# Patient Record
Sex: MALE | Race: WHITE | NOT HISPANIC OR LATINO | Employment: STUDENT | URBAN - METROPOLITAN AREA
[De-identification: names, ages, dates, MRNs, and addresses within clinical notes are randomized per-mention and may not be internally consistent; named-entity substitution may affect disease eponyms.]

---
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
## 2021-06-08 NOTE — TELEPHONE ENCOUNTER
Calling about a medication that had been prescribed to him after he had called in and been given a virtual urgent care visit.  Unable to see this visit.  Geraldo does not know the name of his clinic, or who he spoke with. Transferred to  nurse to see if they are able to open a chart.

## 2021-09-25 ENCOUNTER — HEALTH MAINTENANCE LETTER (OUTPATIENT)
Age: 21
End: 2021-09-25

## 2022-01-15 ENCOUNTER — HEALTH MAINTENANCE LETTER (OUTPATIENT)
Age: 22
End: 2022-01-15

## 2023-01-07 ENCOUNTER — HEALTH MAINTENANCE LETTER (OUTPATIENT)
Age: 23
End: 2023-01-07

## 2023-04-22 ENCOUNTER — HEALTH MAINTENANCE LETTER (OUTPATIENT)
Age: 23
End: 2023-04-22

## 2024-03-12 ENCOUNTER — ANESTHESIA EVENT (OUTPATIENT)
Dept: SURGERY | Facility: HOSPITAL | Age: 24
End: 2024-03-12
Payer: COMMERCIAL

## 2024-03-12 ENCOUNTER — HOSPITAL ENCOUNTER (OUTPATIENT)
Facility: HOSPITAL | Age: 24
Discharge: HOME | End: 2024-03-12
Attending: COLON & RECTAL SURGERY | Admitting: COLON & RECTAL SURGERY
Payer: COMMERCIAL

## 2024-03-12 ENCOUNTER — ANESTHESIA (OUTPATIENT)
Dept: SURGERY | Facility: HOSPITAL | Age: 24
End: 2024-03-12
Payer: COMMERCIAL

## 2024-03-12 VITALS
DIASTOLIC BLOOD PRESSURE: 62 MMHG | RESPIRATION RATE: 20 BRPM | OXYGEN SATURATION: 97 % | TEMPERATURE: 98 F | WEIGHT: 177.1 LBS | HEART RATE: 88 BPM | SYSTOLIC BLOOD PRESSURE: 116 MMHG | HEIGHT: 72 IN | BODY MASS INDEX: 23.99 KG/M2

## 2024-03-12 DIAGNOSIS — K60.30 ANAL FISTULA: Primary | ICD-10-CM

## 2024-03-12 PROCEDURE — 258N000003 HC RX IP 258 OP 636: Performed by: STUDENT IN AN ORGANIZED HEALTH CARE EDUCATION/TRAINING PROGRAM

## 2024-03-12 PROCEDURE — 360N000075 HC SURGERY LEVEL 2, PER MIN: Performed by: COLON & RECTAL SURGERY

## 2024-03-12 PROCEDURE — 250N000009 HC RX 250: Performed by: NURSE ANESTHETIST, CERTIFIED REGISTERED

## 2024-03-12 PROCEDURE — 250N000009 HC RX 250: Performed by: COLON & RECTAL SURGERY

## 2024-03-12 PROCEDURE — 250N000011 HC RX IP 250 OP 636: Performed by: NURSE ANESTHETIST, CERTIFIED REGISTERED

## 2024-03-12 PROCEDURE — 999N000141 HC STATISTIC PRE-PROCEDURE NURSING ASSESSMENT: Performed by: COLON & RECTAL SURGERY

## 2024-03-12 PROCEDURE — 710N000012 HC RECOVERY PHASE 2, PER MINUTE: Performed by: COLON & RECTAL SURGERY

## 2024-03-12 PROCEDURE — 250N000011 HC RX IP 250 OP 636: Performed by: COLON & RECTAL SURGERY

## 2024-03-12 PROCEDURE — 370N000017 HC ANESTHESIA TECHNICAL FEE, PER MIN: Performed by: COLON & RECTAL SURGERY

## 2024-03-12 PROCEDURE — C9290 INJ, BUPIVACAINE LIPOSOME: HCPCS | Performed by: COLON & RECTAL SURGERY

## 2024-03-12 PROCEDURE — 272N000001 HC OR GENERAL SUPPLY STERILE: Performed by: COLON & RECTAL SURGERY

## 2024-03-12 RX ORDER — OXYCODONE HYDROCHLORIDE 5 MG/1
5 TABLET ORAL
Status: DISCONTINUED | OUTPATIENT
Start: 2024-03-12 | End: 2024-03-12 | Stop reason: HOSPADM

## 2024-03-12 RX ORDER — HYDROMORPHONE HYDROCHLORIDE 1 MG/ML
0.4 INJECTION, SOLUTION INTRAMUSCULAR; INTRAVENOUS; SUBCUTANEOUS EVERY 5 MIN PRN
Status: DISCONTINUED | OUTPATIENT
Start: 2024-03-12 | End: 2024-03-12 | Stop reason: HOSPADM

## 2024-03-12 RX ORDER — MAGNESIUM HYDROXIDE 1200 MG/15ML
LIQUID ORAL PRN
Status: DISCONTINUED | OUTPATIENT
Start: 2024-03-12 | End: 2024-03-12 | Stop reason: HOSPADM

## 2024-03-12 RX ORDER — ONDANSETRON 2 MG/ML
INJECTION INTRAMUSCULAR; INTRAVENOUS PRN
Status: DISCONTINUED | OUTPATIENT
Start: 2024-03-12 | End: 2024-03-12

## 2024-03-12 RX ORDER — OXYCODONE HYDROCHLORIDE 5 MG/1
5 TABLET ORAL EVERY 6 HOURS PRN
Qty: 5 TABLET | Refills: 0 | Status: SHIPPED | OUTPATIENT
Start: 2024-03-12 | End: 2024-03-15

## 2024-03-12 RX ORDER — SODIUM CHLORIDE, SODIUM LACTATE, POTASSIUM CHLORIDE, CALCIUM CHLORIDE 600; 310; 30; 20 MG/100ML; MG/100ML; MG/100ML; MG/100ML
INJECTION, SOLUTION INTRAVENOUS CONTINUOUS
Status: DISCONTINUED | OUTPATIENT
Start: 2024-03-12 | End: 2024-03-12 | Stop reason: HOSPADM

## 2024-03-12 RX ORDER — SULFASALAZINE 500 MG/1
1000 TABLET ORAL 2 TIMES DAILY
COMMUNITY

## 2024-03-12 RX ORDER — ONDANSETRON 4 MG/1
4 TABLET, ORALLY DISINTEGRATING ORAL EVERY 30 MIN PRN
Status: DISCONTINUED | OUTPATIENT
Start: 2024-03-12 | End: 2024-03-12 | Stop reason: HOSPADM

## 2024-03-12 RX ORDER — FENTANYL CITRATE 50 UG/ML
50 INJECTION, SOLUTION INTRAMUSCULAR; INTRAVENOUS EVERY 5 MIN PRN
Status: DISCONTINUED | OUTPATIENT
Start: 2024-03-12 | End: 2024-03-12 | Stop reason: HOSPADM

## 2024-03-12 RX ORDER — PROPOFOL 10 MG/ML
INJECTION, EMULSION INTRAVENOUS CONTINUOUS PRN
Status: DISCONTINUED | OUTPATIENT
Start: 2024-03-12 | End: 2024-03-12

## 2024-03-12 RX ORDER — FENTANYL CITRATE 50 UG/ML
25 INJECTION, SOLUTION INTRAMUSCULAR; INTRAVENOUS EVERY 5 MIN PRN
Status: DISCONTINUED | OUTPATIENT
Start: 2024-03-12 | End: 2024-03-12 | Stop reason: HOSPADM

## 2024-03-12 RX ORDER — NALOXONE HYDROCHLORIDE 0.4 MG/ML
0.1 INJECTION, SOLUTION INTRAMUSCULAR; INTRAVENOUS; SUBCUTANEOUS
Status: DISCONTINUED | OUTPATIENT
Start: 2024-03-12 | End: 2024-03-12 | Stop reason: HOSPADM

## 2024-03-12 RX ORDER — LIDOCAINE 40 MG/G
CREAM TOPICAL
Status: DISCONTINUED | OUTPATIENT
Start: 2024-03-12 | End: 2024-03-12 | Stop reason: HOSPADM

## 2024-03-12 RX ORDER — OXYCODONE HYDROCHLORIDE 5 MG/1
10 TABLET ORAL
Status: DISCONTINUED | OUTPATIENT
Start: 2024-03-12 | End: 2024-03-12 | Stop reason: HOSPADM

## 2024-03-12 RX ORDER — ONDANSETRON 4 MG/1
4 TABLET, ORALLY DISINTEGRATING ORAL
Status: DISCONTINUED | OUTPATIENT
Start: 2024-03-12 | End: 2024-03-12 | Stop reason: HOSPADM

## 2024-03-12 RX ORDER — KETAMINE HYDROCHLORIDE 10 MG/ML
INJECTION INTRAMUSCULAR; INTRAVENOUS PRN
Status: DISCONTINUED | OUTPATIENT
Start: 2024-03-12 | End: 2024-03-12

## 2024-03-12 RX ORDER — BUPIVACAINE HYDROCHLORIDE 2.5 MG/ML
INJECTION, SOLUTION INFILTRATION; PERINEURAL PRN
Status: DISCONTINUED | OUTPATIENT
Start: 2024-03-12 | End: 2024-03-12 | Stop reason: HOSPADM

## 2024-03-12 RX ORDER — HYDROMORPHONE HYDROCHLORIDE 1 MG/ML
0.2 INJECTION, SOLUTION INTRAMUSCULAR; INTRAVENOUS; SUBCUTANEOUS EVERY 5 MIN PRN
Status: DISCONTINUED | OUTPATIENT
Start: 2024-03-12 | End: 2024-03-12 | Stop reason: HOSPADM

## 2024-03-12 RX ORDER — LIDOCAINE HYDROCHLORIDE 10 MG/ML
INJECTION, SOLUTION INFILTRATION; PERINEURAL PRN
Status: DISCONTINUED | OUTPATIENT
Start: 2024-03-12 | End: 2024-03-12

## 2024-03-12 RX ORDER — ONDANSETRON 2 MG/ML
4 INJECTION INTRAMUSCULAR; INTRAVENOUS EVERY 30 MIN PRN
Status: DISCONTINUED | OUTPATIENT
Start: 2024-03-12 | End: 2024-03-12 | Stop reason: HOSPADM

## 2024-03-12 RX ADMIN — SODIUM CHLORIDE, POTASSIUM CHLORIDE, SODIUM LACTATE AND CALCIUM CHLORIDE: 600; 310; 30; 20 INJECTION, SOLUTION INTRAVENOUS at 12:04

## 2024-03-12 RX ADMIN — PROPOFOL 200 MCG/KG/MIN: 10 INJECTION, EMULSION INTRAVENOUS at 12:28

## 2024-03-12 RX ADMIN — KETAMINE HYDROCHLORIDE 30 MG: 10 INJECTION INTRAMUSCULAR; INTRAVENOUS at 12:28

## 2024-03-12 RX ADMIN — ONDANSETRON 4 MG: 2 INJECTION INTRAMUSCULAR; INTRAVENOUS at 12:45

## 2024-03-12 RX ADMIN — LIDOCAINE HYDROCHLORIDE 5 ML: 10 INJECTION, SOLUTION INFILTRATION; PERINEURAL at 12:28

## 2024-03-12 RX ADMIN — MIDAZOLAM 2 MG: 1 INJECTION INTRAMUSCULAR; INTRAVENOUS at 12:24

## 2024-03-12 ASSESSMENT — ACTIVITIES OF DAILY LIVING (ADL)
ADLS_ACUITY_SCORE: 35

## 2024-03-12 NOTE — ANESTHESIA PREPROCEDURE EVALUATION
Anesthesia Pre-Procedure Evaluation    Patient: Geraldo Colon   MRN: [id number] : 2000        Procedure : Procedure(s):  RECTAL EXAM UNDER ANESTHESIA  POSSIBLE SETON          Past Medical History:   Diagnosis Date     Hemorrhoids      Ulcerative colitis      Ulcerative colitis (H)       Past Surgical History:   Procedure Laterality Date     ENT SURGERY      [place] [facility].     ESOPHAGOSCOPY, GASTROSCOPY, DUODENOSCOPY (EGD), COMBINED N/A 2020    Procedure: Upper esophagogastroduodenoscopy with endoscopic ultrasound;  Surgeon: Denys Mercado MD;  Location: [facility] OR      Allergies   Allergen Reactions     Amoxicillin-Pot Clavulanate Anaphylaxis     Nsaids GI Disturbance     Has Ulcerative colitis      Social History     Tobacco Use     Smoking status: Never     Smokeless tobacco: Never   Substance Use Topics     Alcohol use: Never      Wt Readings from Last 1 Encounters:   24 80.3 kg (177 lb 1.6 oz)        Anesthesia Evaluation            ROS/MED HX  ENT/Pulmonary:  - neg pulmonary ROS     Neurologic:  - neg neurologic ROS     Cardiovascular:  - neg cardiovascular ROS     METS/Exercise Tolerance:     Hematologic:       Musculoskeletal:       GI/Hepatic:  - neg GI/hepatic ROS     Renal/Genitourinary:  - neg Renal ROS     Endo:  - neg endo ROS     Psychiatric/Substance Use:       Infectious Disease:       Malignancy:       Other:          Physical Exam    Airway        Mallampati: II   TM distance: > 3 FB   Neck ROM: full     Respiratory Devices and Support         Dental       (+) Completely normal teeth      Cardiovascular   cardiovascular exam normal          Pulmonary   pulmonary exam normal            OUTSIDE LABS:  CBC:   Lab Results   Component Value Date    WBC 15.1 (H) 2020    WBC 9.3 2020    HGB 12.3 (L) 2020    HGB 12.2 (L) 2020    HCT 41.0 2020    HCT 40.6 2020     2020     2020     BMP:   Lab Results   Component Value Date  "    05/23/2020     05/18/2020    POTASSIUM 3.6 05/23/2020    POTASSIUM 3.5 05/18/2020    CHLORIDE 105 05/23/2020    CHLORIDE 106 05/18/2020    CO2 25 05/23/2020    CO2 27 05/18/2020    BUN 12 05/23/2020    BUN 15 05/18/2020    CR 0.86 05/23/2020    CR 0.95 05/18/2020     (H) 05/23/2020     (H) 05/18/2020     COAGS: No results found for: \"PTT\", \"INR\", \"FIBR\"  POC: No results found for: \"BGM\", \"HCG\", \"HCGS\"  HEPATIC:   Lab Results   Component Value Date    ALBUMIN 3.7 05/23/2020    PROTTOTAL 9.0 (H) 05/23/2020    ALT 21 05/23/2020    AST 15 05/23/2020    ALKPHOS 84 05/23/2020    BILITOTAL 0.2 05/23/2020     OTHER:   Lab Results   Component Value Date    LACT 0.6 (L) 05/23/2020    YANCI 9.6 05/23/2020    LIPASE 8,082 (H) 05/23/2020    SED 88 (H) 11/26/2006       Anesthesia Plan    ASA Status:  2       Anesthesia Type: MAC.              Consents    Anesthesia Plan(s) and associated risks, benefits, and realistic alternatives discussed. Questions answered and patient/representative(s) expressed understanding.     - Discussed: Risks, Benefits and Alternatives for BOTH SEDATION and the PROCEDURE were discussed     - Discussed with:  Patient      - Extended Intubation/Ventilatory Support Discussed: No.      - Patient is DNR/DNI Status: No     Use of blood products discussed: No .     Postoperative Care    Pain management: IV analgesics, Oral pain medications.   PONV prophylaxis: Dexamethasone or Solumedrol, Ondansetron (or other 5HT-3)     Comments:             Ambrocio Calzada,     I have reviewed the pertinent notes and labs in the chart from the past 30 days and (re)examined the patient.  Any updates or changes from those notes are reflected in this note.                  "

## 2024-03-12 NOTE — PROCEDURES
[facility] OPERATIVE NOTE    DATE OF SERVICE: 3/12/24     PROCEDURE NAME:   Rectal exam under anesthesia  Incision and drainage of perirectal abscess  Placement of seton     PRE-PROCEDURE DIAGNOSIS: ulcerative colitis, possible crohn's disease, anal fistula, perirectal abscess     POST-PROCEDURE DIAGNOSIS: same     SURGEON: Tiffanie Prakash MD     ASSISTANT: none     FINDINGS: small left anterior perirectal abscess, intersphincteric anterior anal fistula     ESTIMATED BLOOD LOSS:  2 mL     ANESTHESIA: mac with local     SPECIMEN: none    INDICATIONS FOR PROCEDURE:  Geraldo Colon is a 23 year old male with a longstanding hx of ulcerative colitis presenting with pain and drainage from his bottom concerning for an anal fistula with a small perianal abscess. We discussed an eua with drainage and likely seton placement given his history of ulcerative colitis.  The risks and benefits of surgery were discussed with the patient including bleeding, infection, damage to the sphincter complex with incontinence, need for further procedures. The patient verbalized understanding and consented to proceed.        DESCRIPTION OF PROCEDURE:  The patient was taken to the operating room and placed under mac anesthesia. They were then placed in the prone wiliam knife position on the operating room table. The buttocks were taped apart. The surgical area was then prepped and draped in the usual sterile fashion. A timeout was performed per protocol. We then started with a perianal block with 0.25% marcaine mixed with 20ml of exparel.  We examined the perianal skin externally and noted a fistula opening anterior just left of midline near the anal verge. However, on palpation further away from this/closer towards the scrotum was an area of fullness where then pus was draining from this opening nearer the anal verge. A digital rectal exam was performed which revealed no masses or stenosis.  The bivalve anoscope was inserted and we  examined the anal canal. There was no proctitis, there was mildly enlarged hemorrhoids.   In placing the fistula probe through this opening it fairly easily tracked to an internal opening midline at the dentate line, this appeared to be intersphincteric. However, given his hx of UC and now possible crohn's and with abscess we decided to leave a seton. This was secured in draining fashion with four 2-0 silk ties. Placing the probe through this same external opening we could feel it tracking away towards this area of fullness with small abscess cavity. I enlarged the external opening a bit but in order to make sure this small abscess cavity that was several cm away was adequately drained we made a small counter incision overlying the maximal area of fullness and placed a seton through the two to make sure this whole cavity drained out. This was also secured in draining fashion.     Hemostasis was achieved. We then removed the anoscope and placed some fluffs for dressing. All sponge, needle and instrument counts were correct at the end of the procedure. The patient tolerated the procedure well and was awakened from anesthesia without difficulty, and transferred to the recovery room in stable condition.    Tiffanie Prakash MD  [facility]  [phone]

## 2024-03-12 NOTE — ANESTHESIA POSTPROCEDURE EVALUATION
Patient: Geraldo Colon    Procedure: Procedure(s):  RECTAL EXAM UNDER ANESTHESIA INCSION AND DRAINAGE OF RECTAL ABSCESS  SETON PLACEMENT       Anesthesia Type:  MAC    Note:  Disposition: Outpatient   Postop Pain Control: Uneventful            Sign Out: Well controlled pain   PONV: No   Neuro/Psych: Uneventful            Sign Out: Acceptable/Baseline neuro status   Airway/Respiratory: Uneventful            Sign Out: Acceptable/Baseline resp. status   CV/Hemodynamics: Uneventful            Sign Out: Acceptable CV status; No obvious hypovolemia; No obvious fluid overload   Other NRE: NONE   DID A NON-ROUTINE EVENT OCCUR? No    Event details/Postop Comments:  Patient recovering comfortably.        Last vitals:  Vitals Value Taken Time   BP     Temp     Pulse     Resp     SpO2         Electronically Signed By: Ambrocio Calzada DO  March 12, 2024  1:22 PM

## 2024-03-12 NOTE — PROGRESS NOTES
[facility]   H&P  History of Present Illness:     23 year old male with small abscess and anal fistula    Past Medical History:   Diagnosis Date     Hemorrhoids      Ulcerative colitis      Ulcerative colitis (H)        Past Surgical History:   Procedure Laterality Date     ENT SURGERY      [facility] [facility].     ESOPHAGOSCOPY, GASTROSCOPY, DUODENOSCOPY (EGD), COMBINED N/A 7/9/2020    Procedure: Upper esophagogastroduodenoscopy with endoscopic ultrasound;  Surgeon: Denys Mercado MD;  Location:  OR       History reviewed. No pertinent family history.    Social History     Socioeconomic History     Marital status: Single     Spouse name: Not on file     Number of children: Not on file     Years of education: Not on file     Highest education level: Not on file   Occupational History     Not on file   Tobacco Use     Smoking status: Never     Smokeless tobacco: Never   Substance and Sexual Activity     Alcohol use: Never     Drug use: Never     Sexual activity: Not Currently   Other Topics Concern     Not on file   Social History Narrative     Not on file     Social Determinants of Health     Financial Resource Strain: Not on file   Food Insecurity: Not on file   Transportation Needs: Not on file   Physical Activity: Not on file   Stress: Not on file   Social Connections: Not on file   Interpersonal Safety: Not on file   Housing Stability: Not on file         Review of Systems: A full 10 point review of systems was taken and is negative aside from what is noted above in the HPI.    Physical Exam:  BP (!) 151/86 (BP Location: Left arm)   Pulse 109   Temp 98.8  F (37.1  C) (Oral)   Resp 16   Ht 1.829 m (6')   Wt 80.3 kg (177 lb 1.6 oz)   SpO2 98%   BMI 24.02 kg/m      General: NAD, alert, cooperative  Head: normocephalic, without abnormality / atraumatic  Respiratory: non-labored breathing, CTAB  CV: regular, tachycardic  Abdomen: soft, non-tender, non-distended.  No guarding or  rebound   Skin: no rashes or lesions  Musculoskeletal: moves all four extremities equally; no calf edema or tenderness  Psychological: alert and oriented, answers questions appropriately  Neurological: cranial nerves grossly intact    Assessment/Plan:   OR for eua with likely seton      Tiffanie Prakash MD  [facility]  [phone]

## 2024-03-12 NOTE — ANESTHESIA CARE TRANSFER NOTE
Patient: Geraldo Colon    Procedure: Procedure(s):  RECTAL EXAM UNDER ANESTHESIA INCSION AND DRAINAGE OF RECTAL ABSCESS  SETON PLACEMENT       Diagnosis: Anal fistula [K60.3]  Diagnosis Additional Information: No value filed.    Anesthesia Type:   MAC     Note:    Oropharynx: oropharynx clear of all foreign objects and spontaneously breathing  Level of Consciousness: awake  Oxygen Supplementation: room air    Independent Airway: airway patency satisfactory and stable  Dentition: dentition unchanged  Vital Signs Stable: post-procedure vital signs reviewed and stable  Report to RN Given: handoff report given  Patient transferred to: PACU    Handoff Report: Identifed the Patient, Identified the Reponsible Provider, Reviewed the pertinent medical history, Discussed the surgical course, Reviewed Intra-OP anesthesia mangement and issues during anesthesia, Set expectations for post-procedure period and Allowed opportunity for questions and acknowledgement of understanding      Vitals:  Vitals Value Taken Time   /82    Temp 98.6    Pulse 76    Resp 16    SpO2 99        Electronically Signed By: STEVEN Amezquita CRNA  March 12, 2024  1:22 PM

## 2024-03-12 NOTE — DISCHARGE INSTRUCTIONS
You have just undergone anorectal surgery.  Here are a few things to expect after your surgery:  1) Everybody has pain - this is expected.  I recommend staying on a baseline of tylenol and ibuprofen (advil) if you are able to take NSAIDs. Use the tylenol and ibuprofen as directed. Generally you can take the tylenol 650mg every 6 hours. You can take ibuprofen 600-800mg every 6-8 hours. Avoid ibuprofen or NSAIDs if you have Crohn's disease, Ulcerative colitis or kidney problems. We will also send you with a prescription for a stronger pain medication like oxycodone. Take this as well in addition to the others if you need it. This can make you have more constipation, and you should not drive while taking this medication. Many patients also find it helps to soak in a warm tub for at least 15 minutes at a time, three to four times a day if possible.  2) Everybody has some spotting or mild amounts of bleeding/bloody drainage.  If you see more significant bleeding, try holding some pressure over the wound for 15-20 minutes.   If you pass more than a cup full of blood or you cannot get the bleeding to stop by holding pressure, please call the office.  3) You may have a small amount of foam packing in your rectum. This will come out on its own with your first bowel movement.  4) Infections rarely occur in this area after surgery.  If you see small amounts of yellowish/pus like drainage in the days following surgery, this is expected.  It is also normal to run a low grade fever (below 101.5 F) following surgery.  5) You will feel numb in the anorectal area after surgery due to the numbing medication used in the operating room.  It is possible you may experience some fecal incontinence (accidental passage of gas/stool) during this time.  The numbness will resolve on its own.  Once you feel sensation returning, you should consider taking something for pain as you can expect oral medications to take 30-60 minutes before you  start feeling their effects.  6) There is generally no specific wound care necessary immediately after surgery.  Simply showering/bathing 1-2 times a day and after bowel movements is sufficient to keep the wounds clean.  You may want to keep a dry gauze/pad over the wound site as it is normal to have some amount of drainage, and this drainage can be irritating to the skin.  If you have a packing in the wound, your surgeon will give you more specific instructions on how to manage this.  7) For some operations you may have stitches in your wound.  Those stitches almost always break within a few days of surgery.  If you feel a pop or the wound opens - this is OK.  The wound will continue to fill in on its own.  It is still ok to shower/bathe as normal.  Expect some amount of drainage if the wound is open.  8) Do not allow yourself to go more than 2 or 3 days after surgery without having had a bowel movement.  If you wait too long, the first bowel movement may be unpleasant.  If you haven t had a bowel after 2 days, take something by mouth to help you go.  Here are a few options:  - Milk of Magnesia 30 mL every 8 hours as necessary  - Miralax 1-2 capfuls daily as necessary  - Senna 1-2 tabs twice a day as necessary  9) Your only activity restrictions are no driving while you are taking narcotics.  I would avoid heavy lifting after hemorrhoidectomy or THD for 2 weeks. You may want to avoid strenuous exercise for the first 1-2 days after surgery, but if you feel up to resuming normal activities/exercise, this is ok.  10) Lastly, if you have any questions or concerns - PLEASE CALL [phone])!  Our office has someone on call 24 hours a day.  If your question is one that can wait until normal business hours (Mon-Fri 8:30AM-5PM), it is better to wait until the daytime as someone more familiar with your care will be able to help you.  However, if it is an emergency, the on-call surgeon will be able to give you good  advice.    #You should have arranged a post operative appointment when surgery was scheduled, but if not or if you are unsure, please call the office at [phone] to arrange/schedule a post operative visit. Follow up in 1-2 weeks

## 2024-03-12 NOTE — ANESTHESIA POSTPROCEDURE EVALUATION
Patient: Geraldo Colon    Procedure: Procedure(s):  RECTAL EXAM UNDER ANESTHESIA INCSION AND DRAINAGE OF RECTAL ABSCESS  SETON PLACEMENT       Anesthesia Type:  MAC    Note:  Disposition: Outpatient   Postop Pain Control: Uneventful            Sign Out: Well controlled pain   PONV: No   Neuro/Psych: Uneventful            Sign Out: Acceptable/Baseline neuro status   Airway/Respiratory: Uneventful            Sign Out: Acceptable/Baseline resp. status   CV/Hemodynamics: Uneventful            Sign Out: Acceptable CV status; No obvious hypovolemia; No obvious fluid overload   Other NRE: NONE   DID A NON-ROUTINE EVENT OCCUR? No    Event details/Postop Comments:  Patient recovering comfortably.        Last vitals:  Vitals Value Taken Time   /62 03/12/24 1345   Temp 36.7  C (98  F) 03/12/24 1345   Pulse 84 03/12/24 1356   Resp 20 03/12/24 1345   SpO2 98 % 03/12/24 1356   Vitals shown include unfiled device data.    Electronically Signed By: Ambrocio Calzada DO  March 12, 2024  2:21 PM

## 2024-06-23 ENCOUNTER — HEALTH MAINTENANCE LETTER (OUTPATIENT)
Age: 24
End: 2024-06-23

## 2024-07-29 ENCOUNTER — HOSPITAL ENCOUNTER (OUTPATIENT)
Facility: HOSPITAL | Age: 24
Discharge: HOME | End: 2024-07-29
Attending: COLON & RECTAL SURGERY | Admitting: COLON & RECTAL SURGERY
Payer: COMMERCIAL

## 2024-07-29 ENCOUNTER — ANESTHESIA (OUTPATIENT)
Dept: SURGERY | Facility: HOSPITAL | Age: 24
End: 2024-07-29
Payer: COMMERCIAL

## 2024-07-29 ENCOUNTER — ANESTHESIA EVENT (OUTPATIENT)
Dept: SURGERY | Facility: HOSPITAL | Age: 24
End: 2024-07-29
Payer: COMMERCIAL

## 2024-07-29 VITALS
SYSTOLIC BLOOD PRESSURE: 105 MMHG | WEIGHT: 176.8 LBS | RESPIRATION RATE: 18 BRPM | BODY MASS INDEX: 23.98 KG/M2 | OXYGEN SATURATION: 97 % | DIASTOLIC BLOOD PRESSURE: 54 MMHG | HEART RATE: 63 BPM | TEMPERATURE: 97.5 F

## 2024-07-29 DIAGNOSIS — K60.30 ANAL FISTULA: Primary | ICD-10-CM

## 2024-07-29 PROCEDURE — 250N000011 HC RX IP 250 OP 636: Performed by: ANESTHESIOLOGY

## 2024-07-29 PROCEDURE — 710N000012 HC RECOVERY PHASE 2, PER MINUTE: Performed by: COLON & RECTAL SURGERY

## 2024-07-29 PROCEDURE — 46280 REMOVE ANAL FIST COMPLEX: CPT | Performed by: ANESTHESIOLOGY

## 2024-07-29 PROCEDURE — 370N000017 HC ANESTHESIA TECHNICAL FEE, PER MIN: Performed by: COLON & RECTAL SURGERY

## 2024-07-29 PROCEDURE — 258N000003 HC RX IP 258 OP 636: Performed by: REGISTERED NURSE

## 2024-07-29 PROCEDURE — 999N000141 HC STATISTIC PRE-PROCEDURE NURSING ASSESSMENT: Performed by: COLON & RECTAL SURGERY

## 2024-07-29 PROCEDURE — 272N000001 HC OR GENERAL SUPPLY STERILE: Performed by: COLON & RECTAL SURGERY

## 2024-07-29 PROCEDURE — 250N000011 HC RX IP 250 OP 636: Performed by: REGISTERED NURSE

## 2024-07-29 PROCEDURE — 250N000009 HC RX 250: Performed by: REGISTERED NURSE

## 2024-07-29 PROCEDURE — 360N000075 HC SURGERY LEVEL 2, PER MIN: Performed by: COLON & RECTAL SURGERY

## 2024-07-29 PROCEDURE — 258N000003 HC RX IP 258 OP 636: Performed by: ANESTHESIOLOGY

## 2024-07-29 PROCEDURE — 46280 REMOVE ANAL FIST COMPLEX: CPT | Performed by: REGISTERED NURSE

## 2024-07-29 PROCEDURE — 250N000013 HC RX MED GY IP 250 OP 250 PS 637: Performed by: ANESTHESIOLOGY

## 2024-07-29 PROCEDURE — 250N000009 HC RX 250: Performed by: COLON & RECTAL SURGERY

## 2024-07-29 RX ORDER — OXYCODONE HYDROCHLORIDE 5 MG/1
5 TABLET ORAL EVERY 6 HOURS PRN
Qty: 8 TABLET | Refills: 0 | Status: SHIPPED | OUTPATIENT
Start: 2024-07-29 | End: 2024-08-01

## 2024-07-29 RX ORDER — FENTANYL CITRATE 50 UG/ML
25 INJECTION, SOLUTION INTRAMUSCULAR; INTRAVENOUS
Status: DISCONTINUED | OUTPATIENT
Start: 2024-07-29 | End: 2024-07-29 | Stop reason: HOSPADM

## 2024-07-29 RX ORDER — OXYCODONE HYDROCHLORIDE 5 MG/1
10 TABLET ORAL
Status: DISCONTINUED | OUTPATIENT
Start: 2024-07-29 | End: 2024-07-29 | Stop reason: HOSPADM

## 2024-07-29 RX ORDER — GLYCOPYRROLATE 0.2 MG/ML
INJECTION, SOLUTION INTRAMUSCULAR; INTRAVENOUS PRN
Status: DISCONTINUED | OUTPATIENT
Start: 2024-07-29 | End: 2024-07-29

## 2024-07-29 RX ORDER — ONDANSETRON 4 MG/1
4 TABLET, ORALLY DISINTEGRATING ORAL
Status: DISCONTINUED | OUTPATIENT
Start: 2024-07-29 | End: 2024-07-29 | Stop reason: HOSPADM

## 2024-07-29 RX ORDER — ONDANSETRON 4 MG/1
4 TABLET, ORALLY DISINTEGRATING ORAL EVERY 30 MIN PRN
Status: DISCONTINUED | OUTPATIENT
Start: 2024-07-29 | End: 2024-07-29 | Stop reason: HOSPADM

## 2024-07-29 RX ORDER — PROPOFOL 10 MG/ML
INJECTION, EMULSION INTRAVENOUS CONTINUOUS PRN
Status: DISCONTINUED | OUTPATIENT
Start: 2024-07-29 | End: 2024-07-29

## 2024-07-29 RX ORDER — SODIUM CHLORIDE, SODIUM LACTATE, POTASSIUM CHLORIDE, CALCIUM CHLORIDE 600; 310; 30; 20 MG/100ML; MG/100ML; MG/100ML; MG/100ML
INJECTION, SOLUTION INTRAVENOUS CONTINUOUS
Status: DISCONTINUED | OUTPATIENT
Start: 2024-07-29 | End: 2024-07-29 | Stop reason: HOSPADM

## 2024-07-29 RX ORDER — ACETAMINOPHEN 160 MG
TABLET,DISINTEGRATING ORAL PRN
Status: DISCONTINUED | OUTPATIENT
Start: 2024-07-29 | End: 2024-07-29 | Stop reason: HOSPADM

## 2024-07-29 RX ORDER — NALOXONE HYDROCHLORIDE 0.4 MG/ML
0.1 INJECTION, SOLUTION INTRAMUSCULAR; INTRAVENOUS; SUBCUTANEOUS
Status: DISCONTINUED | OUTPATIENT
Start: 2024-07-29 | End: 2024-07-29 | Stop reason: HOSPADM

## 2024-07-29 RX ORDER — ACETAMINOPHEN 325 MG/1
975 TABLET ORAL ONCE
Status: COMPLETED | OUTPATIENT
Start: 2024-07-29 | End: 2024-07-29

## 2024-07-29 RX ORDER — KETAMINE HYDROCHLORIDE 10 MG/ML
INJECTION INTRAMUSCULAR; INTRAVENOUS PRN
Status: DISCONTINUED | OUTPATIENT
Start: 2024-07-29 | End: 2024-07-29

## 2024-07-29 RX ORDER — BUPIVACAINE HYDROCHLORIDE AND EPINEPHRINE 2.5; 5 MG/ML; UG/ML
INJECTION, SOLUTION EPIDURAL; INFILTRATION; INTRACAUDAL; PERINEURAL PRN
Status: DISCONTINUED | OUTPATIENT
Start: 2024-07-29 | End: 2024-07-29 | Stop reason: HOSPADM

## 2024-07-29 RX ORDER — DEXAMETHASONE SODIUM PHOSPHATE 10 MG/ML
4 INJECTION, SOLUTION INTRAMUSCULAR; INTRAVENOUS
Status: COMPLETED | OUTPATIENT
Start: 2024-07-29 | End: 2024-07-29

## 2024-07-29 RX ORDER — MAGNESIUM HYDROXIDE 1200 MG/15ML
LIQUID ORAL PRN
Status: DISCONTINUED | OUTPATIENT
Start: 2024-07-29 | End: 2024-07-29 | Stop reason: HOSPADM

## 2024-07-29 RX ORDER — LIDOCAINE HYDROCHLORIDE 10 MG/ML
INJECTION, SOLUTION INFILTRATION; PERINEURAL PRN
Status: DISCONTINUED | OUTPATIENT
Start: 2024-07-29 | End: 2024-07-29

## 2024-07-29 RX ORDER — LIDOCAINE 40 MG/G
CREAM TOPICAL
Status: DISCONTINUED | OUTPATIENT
Start: 2024-07-29 | End: 2024-07-29 | Stop reason: HOSPADM

## 2024-07-29 RX ORDER — ONDANSETRON 2 MG/ML
4 INJECTION INTRAMUSCULAR; INTRAVENOUS EVERY 30 MIN PRN
Status: DISCONTINUED | OUTPATIENT
Start: 2024-07-29 | End: 2024-07-29 | Stop reason: HOSPADM

## 2024-07-29 RX ORDER — OXYCODONE HYDROCHLORIDE 5 MG/1
5 TABLET ORAL
Status: DISCONTINUED | OUTPATIENT
Start: 2024-07-29 | End: 2024-07-29 | Stop reason: HOSPADM

## 2024-07-29 RX ADMIN — DEXMEDETOMIDINE HYDROCHLORIDE 12 MCG: 100 INJECTION, SOLUTION INTRAVENOUS at 09:25

## 2024-07-29 RX ADMIN — DEXAMETHASONE SODIUM PHOSPHATE 10 MG: 10 INJECTION, SOLUTION INTRAMUSCULAR; INTRAVENOUS at 09:30

## 2024-07-29 RX ADMIN — ONDANSETRON 4 MG: 2 INJECTION INTRAMUSCULAR; INTRAVENOUS at 09:25

## 2024-07-29 RX ADMIN — GLYCOPYRROLATE 0.2 MG: 0.2 INJECTION INTRAMUSCULAR; INTRAVENOUS at 09:25

## 2024-07-29 RX ADMIN — MIDAZOLAM 2 MG: 1 INJECTION INTRAMUSCULAR; INTRAVENOUS at 09:08

## 2024-07-29 RX ADMIN — KETAMINE HYDROCHLORIDE 30 MG: 10 INJECTION INTRAMUSCULAR; INTRAVENOUS at 09:30

## 2024-07-29 RX ADMIN — PROPOFOL 200 MCG/KG/MIN: 10 INJECTION, EMULSION INTRAVENOUS at 09:19

## 2024-07-29 RX ADMIN — DEXMEDETOMIDINE HYDROCHLORIDE 8 MCG: 100 INJECTION, SOLUTION INTRAVENOUS at 09:30

## 2024-07-29 RX ADMIN — ACETAMINOPHEN 975 MG: 325 TABLET ORAL at 07:59

## 2024-07-29 RX ADMIN — LIDOCAINE HYDROCHLORIDE 5 ML: 10 INJECTION, SOLUTION INFILTRATION; PERINEURAL at 09:18

## 2024-07-29 RX ADMIN — SODIUM CHLORIDE, POTASSIUM CHLORIDE, SODIUM LACTATE AND CALCIUM CHLORIDE: 600; 310; 30; 20 INJECTION, SOLUTION INTRAVENOUS at 07:54

## 2024-07-29 ASSESSMENT — ACTIVITIES OF DAILY LIVING (ADL)
ADLS_ACUITY_SCORE: 18
ADLS_ACUITY_SCORE: 35
ADLS_ACUITY_SCORE: 18

## 2024-07-29 ASSESSMENT — LIFESTYLE VARIABLES: TOBACCO_USE: 0

## 2024-07-29 NOTE — ANESTHESIA POSTPROCEDURE EVALUATION
Patient: Geraldo Colon    Procedure: Procedure(s):  RECTAL EXAM UNDER ANESTHESIA WITH FISTULOTOMY       Anesthesia Type:  MAC    Note:  Disposition: Outpatient   Postop Pain Control: Uneventful            Sign Out: Well controlled pain   PONV: No   Neuro/Psych: Uneventful            Sign Out: Acceptable/Baseline neuro status   Airway/Respiratory: Uneventful            Sign Out: Acceptable/Baseline resp. status   CV/Hemodynamics: Uneventful            Sign Out: Acceptable CV status; No obvious hypovolemia; No obvious fluid overload   Other NRE: NONE   DID A NON-ROUTINE EVENT OCCUR? No           Last vitals:  Vitals Value Taken Time   /54 07/29/24 1045   Temp 36.4  C (97.5  F) 07/29/24 1030   Pulse 75 07/29/24 1052   Resp 0 07/29/24 1027   SpO2 99 % 07/29/24 1052   Vitals shown include unfiled device data.    Electronically Signed By: Yazmin Figueroa MD  July 29, 2024  12:52 PM

## 2024-07-29 NOTE — ANESTHESIA PREPROCEDURE EVALUATION
Anesthesia Pre-Procedure Evaluation    Patient: Geraldo Colon   MRN: [id number] : 2000        Procedure : Procedure(s):  RECTAL EXAM UNDER ANESTHESIA WITH FISTULOTOMY  AND PLACEMENT OF SETON          Past Medical History:   Diagnosis Date    Hemorrhoids     Ulcerative colitis     Ulcerative colitis (H)       Past Surgical History:   Procedure Laterality Date    ENT SURGERY      [facility].    ESOPHAGOSCOPY, GASTROSCOPY, DUODENOSCOPY (EGD), COMBINED N/A 2020    Procedure: Upper esophagogastroduodenoscopy with endoscopic ultrasound;  Surgeon: Denys Mercado MD;  Location: [facility] OR    EXAM UNDER ANESTHESIA RECTUM N/A 3/12/2024    Procedure: RECTAL EXAM UNDER ANESTHESIA I;  Surgeon: Tiffanie Prakash MD;  [place] [facility] [place]    INCISION AND DRAINAGE, ABSCESS, RECTUM N/A 3/12/2024    Procedure: NCSION AND DRAINAGE OF RECTAL ABSCESS;  Surgeon: Tiffanie Prakash MD;  [place] [facility] [place]    PLACEMENT OF SETON RECTUM N/A 3/12/2024    Procedure: SETON PLACEMENT;  Surgeon: Tiffanie Prakash MD;  [place] [facility] [place]      Allergies   Allergen Reactions    Amoxicillin-Pot Clavulanate Anaphylaxis    Nsaids GI Disturbance     Has Ulcerative colitis      Social History     Tobacco Use    Smoking status: Never    Smokeless tobacco: Never   Substance Use Topics    Alcohol use: Never      Wt Readings from Last 1 Encounters:   24 80.2 kg (176 lb 12.8 oz)        Anesthesia Evaluation   Pt has had prior anesthetic.     No history of anesthetic complications       ROS/MED HX  ENT/Pulmonary:    (-) tobacco use   Neurologic:  - neg neurologic ROS     Cardiovascular:  - neg cardiovascular ROS     METS/Exercise Tolerance: >4 METS    Hematologic:  - neg hematologic  ROS     Musculoskeletal:  - neg musculoskeletal ROS     GI/Hepatic:     (+)       Inflammatory bowel disease,             Renal/Genitourinary:  - neg Renal ROS     Endo:    (-) chronic steroid usage   Psychiatric/Substance Use:  - neg  "psychiatric ROS     Infectious Disease:  - neg infectious disease ROS     Malignancy:       Other:            Physical Exam    Airway        Mallampati: III   TM distance: > 3 FB   Neck ROM: full   Mouth opening: > 3 cm    Respiratory Devices and Support         Dental       (+) Minor Abnormalities - some fillings, tiny chips      Cardiovascular          Rhythm and rate: regular     Pulmonary           breath sounds clear to auscultation           OUTSIDE LABS:  CBC:   Lab Results   Component Value Date    WBC 15.1 (H) 05/23/2020    WBC 9.3 05/18/2020    HGB 12.3 (L) 05/23/2020    HGB 12.2 (L) 05/18/2020    HCT 41.0 05/23/2020    HCT 40.6 05/18/2020     05/23/2020     05/18/2020     BMP:   Lab Results   Component Value Date     05/23/2020     05/18/2020    POTASSIUM 3.6 05/23/2020    POTASSIUM 3.5 05/18/2020    CHLORIDE 105 05/23/2020    CHLORIDE 106 05/18/2020    CO2 25 05/23/2020    CO2 27 05/18/2020    BUN 12 05/23/2020    BUN 15 05/18/2020    CR 0.86 05/23/2020    CR 0.95 05/18/2020     (H) 05/23/2020     (H) 05/18/2020     COAGS: No results found for: \"PTT\", \"INR\", \"FIBR\"  POC: No results found for: \"BGM\", \"HCG\", \"HCGS\"  HEPATIC:   Lab Results   Component Value Date    ALBUMIN 3.7 05/23/2020    PROTTOTAL 9.0 (H) 05/23/2020    ALT 21 05/23/2020    AST 15 05/23/2020    ALKPHOS 84 05/23/2020    BILITOTAL 0.2 05/23/2020     OTHER:   Lab Results   Component Value Date    LACT 0.6 (L) 05/23/2020    YANCI 9.6 05/23/2020    LIPASE 8,082 (H) 05/23/2020    SED 88 (H) 11/26/2006       Anesthesia Plan    ASA Status:  2    NPO Status:  NPO Appropriate    Anesthesia Type: MAC.   Induction: N/a.   Maintenance: TIVA.        Consents    Anesthesia Plan(s) and associated risks, benefits, and realistic alternatives discussed. Questions answered and patient/representative(s) expressed understanding.     - Discussed:     - Discussed with:  Patient            Postoperative Care    Pain " management: Multi-modal analgesia.   PONV prophylaxis: Dexamethasone or Solumedrol, Ondansetron (or other 5HT-3)     Comments:               Yazmin Figueroa MD

## 2024-07-29 NOTE — DISCHARGE INSTRUCTIONS
You have just undergone anorectal surgery.  Here are a few things to expect after your surgery:  1) Everybody has pain - this is expected.  I recommend staying on a baseline of tylenol and ibuprofen (advil) if you are able to take NSAIDs. Use the tylenol and ibuprofen as directed. Generally you can take the tylenol 650mg every 6 hours. You can take ibuprofen 600-800mg every 6-8 hours. Avoid ibuprofen or NSAIDs if you have Crohn's disease, Ulcerative colitis or kidney problems. We will also send you with a prescription for a stronger pain medication like oxycodone. Take this as well in addition to the others if you need it. This can make you have more constipation, and you should not drive while taking this medication. Many patients also find it helps to soak in a warm tub for at least 15 minutes at a time, three to four times a day if possible.  2) Everybody has some spotting or mild amounts of bleeding/bloody drainage.  If you see more significant bleeding, try holding some pressure over the wound for 15-20 minutes.   If you pass more than a cup full of blood or you cannot get the bleeding to stop by holding pressure, please call the office.  3) You may have a small amount of foam packing in your rectum. This will come out on its own with your first bowel movement.  4) Infections rarely occur in this area after surgery.  If you see small amounts of yellowish/pus like drainage in the days following surgery, this is expected.  It is also normal to run a low grade fever (below 101.5 F) following surgery.  5) You will feel numb in the anorectal area after surgery due to the numbing medication used in the operating room.  It is possible you may experience some fecal incontinence (accidental passage of gas/stool) during this time.  The numbness will resolve on its own.  Once you feel sensation returning, you should consider taking something for pain as you can expect oral medications to take 30-60 minutes before you  start feeling their effects.  6) There is generally no specific wound care necessary immediately after surgery.  Simply showering/bathing 1-2 times a day and after bowel movements is sufficient to keep the wounds clean.  You may want to keep a dry gauze/pad over the wound site as it is normal to have some amount of drainage, and this drainage can be irritating to the skin.  If you have a packing in the wound, your surgeon will give you more specific instructions on how to manage this.  7) For some operations you may have stitches in your wound.  Those stitches almost always break within a few days of surgery.  If you feel a pop or the wound opens - this is OK.  The wound will continue to fill in on its own.  It is still ok to shower/bathe as normal.  Expect some amount of drainage if the wound is open.  8) Do not allow yourself to go more than 2 or 3 days after surgery without having had a bowel movement.  If you wait too long, the first bowel movement may be unpleasant.  If you haven t had a bowel after 2 days, take something by mouth to help you go.  Here are a few options:  - Milk of Magnesia 30 mL every 8 hours as necessary  - Miralax 1-2 capfuls daily as necessary  - Senna 1-2 tabs twice a day as necessary  9) Your only activity restrictions are no driving while you are taking narcotics.  I would avoid heavy lifting after hemorrhoidectomy or THD for 2 weeks. You may want to avoid strenuous exercise for the first 1-2 days after surgery, but if you feel up to resuming normal activities/exercise, this is ok.  10) Lastly, if you have any questions or concerns - PLEASE CALL ([phone])!  Our office has someone on call 24 hours a day.  If your question is one that can wait until normal business hours (Mon-Fri 8:30AM-5PM), it is better to wait until the daytime as someone more familiar with your care will be able to help you.  However, if it is an emergency, the on-call surgeon will be able to give you good  advice.    #You should have arranged a post operative appointment when surgery was scheduled, but if not or if you are unsure, please call the office at [phone] to arrange/schedule a post operative visit.

## 2024-07-29 NOTE — PROCEDURES
[facility] OPERATIVE [facility]    DATE OF SERVICE: 7/29/24     PROCEDURE NAME:   Rectal exam under anesthesia  Fistulotomy     PRE-PROCEDURE DIAGNOSIS: anal fistula, hx of ulcerative colitis     POST-PROCEDURE DIAGNOSIS: same     SURGEON: Tiffanie Prakash MD     ASSISTANT: none     FINDINGS: anterior intersphincteric anal fistula. No proctitis or other anorectal signs of crohn's     ESTIMATED BLOOD LOSS: 2mL     ANESTHESIA: mac with local     SPECIMEN: none    INDICATIONS FOR PROCEDURE:  Geraldo Colon is a 24 year old male presenting with an anal fistula and abscess who had a prior abscess drainage and seton placement. He has a longstanding hx of UC for which he is on biologics. On his initial operation his anal canal otherwise appeared normal and the fistula appeared cryptoglandular in origin, however we placed a seton initially to discuss with his GI doctor. His seton had subsequently fallen out and he continued to have some intermittent pain and drainage from the external opening. He had recently undergone an updated colonoscopy which showed some mild remaining inflammation but mostly on the right side, a normal terminal ileum and minimal rectal inflammation. We discussed a likely fistulotomy  The risks and benefits of surgery were discussed with the patient including bleeding, infection, damage to the sphincter complex with incontinence, possibility this is crohn's with impaired wound healing or fistula recurrence, decreased pouch function should he need that in the future. The patient verbalized understanding and consented to proceed.        DESCRIPTION OF PROCEDURE:  The patient was taken to the operating room and placed under mac anesthesia. They were then placed in the prone wiliam knife position on the operating room table. The buttocks were taped apart. The surgical area was then prepped and draped in the usual sterile fashion. A timeout was performed per protocol. We then started with a perianal  block with 0.25% marcaine with epi.  We examined the perianal skin externally and noted this external opening anterior just left of midlne. A digital rectal exam was performed which revealed no stenosis or masses.  The bivalve anoscope was inserted and we examined the anal canal. We could see the partially healed over internal opening, which had migrated just distal to the dentate line likely due to the prior seton that was in place. The rest looked otherwise normal outside of some mildly enlarged internal hemorrhoids but there was no other scarring, proctitis or anal skin tags that could indicate crohn's disease. I placed a barr fistula probe through the external opening which was headed towards this internal portion but bc was partially healed over initially did not pass easily. I injected some hydrogen peroxide and did see some bubbling here to further confirm this was the opening and then we were able to pass the fistula probe. This felt fairly superficial as prior and so a fistulotomy was performed, we encountered very little internal sphincter.  The wound was curretted out and the edges marsupialized with interrupted 3-0 vicryl.    Hemostasis was achieved. We then removed the anoscope and placed some fluffs for dressing. All sponge, needle and instrument counts were correct at the end of the procedure. The patient tolerated the procedure well and was awakened from anesthesia without difficulty, and transferred to the recovery room in stable condition.    Tiffanie Prakash MD  [facility]  [phone]

## 2024-07-29 NOTE — H&P
[facility]   H&P  History of Present Illness:     24 year old male with anal fistula    Past Medical History:   Diagnosis Date    Hemorrhoids     Ulcerative colitis     Ulcerative colitis (H)        Past Surgical History:   Procedure Laterality Date    ENT SURGERY      [place] [facility].    ESOPHAGOSCOPY, GASTROSCOPY, DUODENOSCOPY (EGD), COMBINED N/A 7/9/2020    Procedure: Upper esophagogastroduodenoscopy with endoscopic ultrasound;  Surgeon: Denys Mercado MD;  Location: [facility] OR    EXAM UNDER ANESTHESIA RECTUM N/A 3/12/2024    Procedure: RECTAL EXAM UNDER ANESTHESIA I;  Surgeon: Tiffanie Prakash MD;  Location: [facility] OR    INCISION AND DRAINAGE, ABSCESS, RECTUM N/A 3/12/2024    Procedure: NCSION AND DRAINAGE OF RECTAL ABSCESS;  Surgeon: Tiffanie Prakash MD;  Location: [facility] OR    PLACEMENT OF SETON RECTUM N/A 3/12/2024    Procedure: SETON PLACEMENT;  Surgeon: Tiffanie Prakash MD;  Location: [facility] OR       History reviewed. No pertinent family history.    Social History     Socioeconomic History    Marital status: Single     Spouse name: Not on file    Number of children: Not on file    Years of education: Not on file    Highest education level: Not on file   Occupational History    Not on file   Tobacco Use    Smoking status: Never    Smokeless tobacco: Never   Substance and Sexual Activity    Alcohol use: Never    Drug use: Never    Sexual activity: Not Currently   Other Topics Concern    Not on file   Social History Narrative    Not on file     Social Determinants of Health     Financial Resource Strain: Not on file   Food Insecurity: Not on file   Transportation Needs: Not on file   Physical Activity: Not on file   Stress: Not on file   Social Connections: Not on file   Interpersonal Safety: Not on file   Housing Stability: Not on file         Review of Systems: A full 10 point review of systems was taken and is negative aside from what is noted above in  the HPI.    Physical Exam:  /76 (BP Location: Right arm)   Pulse 88   Temp 98.5  F (36.9  C) (Oral)   Resp 18   Wt 80.2 kg (176 lb 12.8 oz)   SpO2 100%   BMI 23.98 kg/m      General: NAD, alert, cooperative  Head: normocephalic, without abnormality / atraumatic  Respiratory: non-labored breathing, CTAB  CV: RRR  Abdomen: soft, non-tender, non-distended.  No guarding or rebound   Skin: no rashes or lesions  Musculoskeletal: moves all four extremities equally; no calf edema or tenderness  Psychological: alert and oriented, answers questions appropriately  Neurological: cranial nerves grossly intact    Assessment/Plan:   Or for fistulotomy      Tiffanie Prakash MD  [facility]  [phone]

## 2024-07-29 NOTE — ANESTHESIA CARE TRANSFER NOTE
Patient: Geraldo Colon    Procedure: Procedure(s):  RECTAL EXAM UNDER ANESTHESIA WITH FISTULOTOMY       Diagnosis: Anal fistula [K60.3]  Diagnosis Additional Information: No value filed.    Anesthesia Type:   MAC     Note:    Oropharynx: oropharynx clear of all foreign objects and spontaneously breathing  Level of Consciousness: awake  Oxygen Supplementation: room air    Independent Airway: airway patency satisfactory and stable  Dentition: dentition unchanged  Vital Signs Stable: post-procedure vital signs reviewed and stable  Report to RN Given: handoff report given  Patient transferred to: Phase II    Handoff Report: Identifed the Patient, Identified the Reponsible Provider, Reviewed the pertinent medical history, Discussed the surgical course, Reviewed Intra-OP anesthesia mangement and issues during anesthesia, Set expectations for post-procedure period and Allowed opportunity for questions and acknowledgement of understanding      Vitals:  Vitals Value Taken Time   /59 07/29/24 1000   Temp 36.5  C (97.7  F) 07/29/24 1000   Pulse 81 07/29/24 1008   Resp 0 07/29/24 1008   SpO2 94 % 07/29/24 1008   Vitals shown include unfiled device data.    Electronically Signed By: STEVEN Roth CRNA  July 29, 2024  10:10 AM

## 2025-07-12 ENCOUNTER — HEALTH MAINTENANCE LETTER (OUTPATIENT)
Age: 25
End: 2025-07-12

## (undated) DEVICE — TAPE ADH POROUS 3IN CURITY STD 7046C

## (undated) DEVICE — SOL WATER IRRIG 1000ML BOTTLE 2F7114

## (undated) DEVICE — JELLY LUBRICATING SURGILUBE 2OZ TUBE

## (undated) DEVICE — NEEDLE HYPO 18X1-1/2 SAFETY 305918

## (undated) DEVICE — GOWN LG DISP 9515

## (undated) DEVICE — SYR 10ML FINGER CONTROL W/O NDL 309695

## (undated) DEVICE — NEEDLE HYPO MAGELLAN SAFETY 22GA 1 1/2IN 8881850215

## (undated) DEVICE — SUCTION MANIFOLD NEPTUNE 2 SYS 1 PORT 702-025-000

## (undated) DEVICE — SYR 20ML LL W/O NDL 302830

## (undated) DEVICE — GLOVE PI ULTRATCH M LF SZ 6.5 PF CUFF TEXT STRL LF 42665

## (undated) DEVICE — TRAY PREP DRY SKIN SCRUB 067

## (undated) DEVICE — ENDO PROBE COVER ULTRASOUND BALLOON LATEX  MAJ-249

## (undated) DEVICE — GOWN IMPERVIOUS BREATHABLE SMART LG 89015

## (undated) DEVICE — NEEDLE HYPO 21 X 1 SAFETY 305915

## (undated) DEVICE — KIT ENDO FIRST STEP DISINFECTANT 200ML W/POUCH EP-4

## (undated) DEVICE — ESU ELEC NDL 1" COATED/INSULATED E1465

## (undated) DEVICE — BLADE CLIPPER DISP 4406

## (undated) DEVICE — ADH LIQUID MASTISOL TOPICAL VIAL 2-3ML 0523-48

## (undated) DEVICE — DRSG KERLIX FLUFFS X5

## (undated) DEVICE — SU VICRYL+ 3-0 27IN SH UND VCP416H

## (undated) DEVICE — ESU GROUND PAD ADULT REM W/15' CORD E7507DB

## (undated) DEVICE — CAUTERY BLADE NEEDLE 1IN COATED E1452

## (undated) DEVICE — KIT PROCEDURE W/CLEAN-A-SCOPE LINERS V2 200800

## (undated) DEVICE — BRIEF STRETCH XL MPS40

## (undated) DEVICE — BAG CLEAR TRASH 1.3M 39X33" P4040C

## (undated) DEVICE — CUSTOM PACK GEN MAJOR SBA5BGMHEA

## (undated) DEVICE — DECANTER VIAL 2006S

## (undated) DEVICE — ESU ELEC BLADE 2.75" COATED/INSULATED E1455

## (undated) DEVICE — TUBING SUCTION 12"X1/4" N612

## (undated) DEVICE — DRAPE OR LAPAROTOMY KC 89228*

## (undated) DEVICE — ESU PENCIL SMOKE EVAC W/ROCKER SWITCH 0703-047-000

## (undated) DEVICE — SOL NACL 0.9% IRRIG 1000ML BOTTLE 2F7124

## (undated) DEVICE — SYR 10ML LL W/O NDL 302995

## (undated) DEVICE — SU SILK 2-0 TIE 18 SA65H

## (undated) DEVICE — GLOVE UNDER INDICATOR PI SZ 6.5 LF 41665

## (undated) DEVICE — PREP POVIDONE-IODINE 10% SOLUTION 4OZ BOTTLE MDS093944

## (undated) DEVICE — VESSEL LOOP BLUE MAXI 30-723

## (undated) DEVICE — SUCTION TIP YANKAUER W/O VENT K86

## (undated) DEVICE — SOL HYDROGEN PEROXIDE 3% 4OZ BOTTLE F0010

## (undated) DEVICE — TAPE TRANSPORE 2"X1.5YD 1534S-2

## (undated) DEVICE — PREP DYNA-HEX 4% CHG SCRUB 4OZ BOTTLE MDS098710

## (undated) DEVICE — SOL WATER IRRIG 1000ML BOTTLE 07139-09

## (undated) DEVICE — GOWN XLG DISP 9545

## (undated) DEVICE — SUCTION CANISTER MEDIVAC LINER 3000ML W/LID 65651-530

## (undated) RX ORDER — BUPIVACAINE HYDROCHLORIDE AND EPINEPHRINE 2.5; 5 MG/ML; UG/ML
INJECTION, SOLUTION EPIDURAL; INFILTRATION; INTRACAUDAL; PERINEURAL
Status: DISPENSED
Start: 2024-03-12

## (undated) RX ORDER — ONDANSETRON 2 MG/ML
INJECTION INTRAMUSCULAR; INTRAVENOUS
Status: DISPENSED
Start: 2020-07-09

## (undated) RX ORDER — BUPIVACAINE HYDROCHLORIDE 2.5 MG/ML
INJECTION, SOLUTION EPIDURAL; INFILTRATION; INTRACAUDAL
Status: DISPENSED
Start: 2024-03-12

## (undated) RX ORDER — DEXAMETHASONE SODIUM PHOSPHATE 4 MG/ML
INJECTION, SOLUTION INTRA-ARTICULAR; INTRALESIONAL; INTRAMUSCULAR; INTRAVENOUS; SOFT TISSUE
Status: DISPENSED
Start: 2020-07-09

## (undated) RX ORDER — GLYCOPYRROLATE 0.2 MG/ML
INJECTION INTRAMUSCULAR; INTRAVENOUS
Status: DISPENSED
Start: 2020-07-09

## (undated) RX ORDER — LIDOCAINE HYDROCHLORIDE 10 MG/ML
INJECTION, SOLUTION EPIDURAL; INFILTRATION; INTRACAUDAL; PERINEURAL
Status: DISPENSED
Start: 2024-03-12

## (undated) RX ORDER — PROPOFOL 10 MG/ML
INJECTION, EMULSION INTRAVENOUS
Status: DISPENSED
Start: 2024-03-12

## (undated) RX ORDER — GINSENG 100 MG
CAPSULE ORAL
Status: DISPENSED
Start: 2024-03-12

## (undated) RX ORDER — BUPIVACAINE HYDROCHLORIDE AND EPINEPHRINE 2.5; 5 MG/ML; UG/ML
INJECTION, SOLUTION EPIDURAL; INFILTRATION; INTRACAUDAL; PERINEURAL
Status: DISPENSED
Start: 2024-07-29

## (undated) RX ORDER — ONDANSETRON 2 MG/ML
INJECTION INTRAMUSCULAR; INTRAVENOUS
Status: DISPENSED
Start: 2024-03-12

## (undated) RX ORDER — NEOSTIGMINE METHYLSULFATE 1 MG/ML
VIAL (ML) INJECTION
Status: DISPENSED
Start: 2020-07-09

## (undated) RX ORDER — FENTANYL CITRATE 50 UG/ML
INJECTION, SOLUTION INTRAMUSCULAR; INTRAVENOUS
Status: DISPENSED
Start: 2020-07-09